# Patient Record
(demographics unavailable — no encounter records)

---

## 2022-04-04 NOTE — PHYS DOC
Past Medical History


Past Medical History:  Anxiety, Other


Additional Past Medical Histor:  Cushings


Past Surgical History:  Other


Additional Past Surgical Histo:  Uterine fibroid removal, D&C


Smoking Status:  Never Smoker


Alcohol Use:  None


Drug Use:  None





General Adult


EDM:


Chief Complaint:  COUGH





HPI:


HPI:





Patient is a 24-year-old female that presents today with cough, fever, ear pain,

and body aches.  Patient states her symptoms started approximately last Friday 

and have progressively gotten worse over the last couple of days, the only 

medication she is taken for her symptoms is ibuprofen.  Patient states that she 

had Covid back in January 2022 and she said she made a full recovery and was not

having any problems post Covid until last Friday.





Review of Systems:


Review of Systems:


Constitutional:   fever or chills. []


Eyes:   Denies change in visual acuity. []


HENT:   nasal congestion and ear pain or denies sore throat. [] 


Respiratory: Cough denies shortness of breath. [] 


Cardiovascular:   Denies chest pain or edema. [] 


GI:   Denies abdominal pain, nausea, vomiting, bloody stools or diarrhea. [] 


:  Denies dysuria. [] 


Musculoskeletal:   Denies back pain or joint pain. [] 


Integument:   Denies rash. [] 


Neurologic:   Denies headache, focal weakness or sensory changes. [] 


Endocrine:   Denies polyuria or polydipsia. [] 


Lymphatic:  Denies swollen glands. [] 


Psychiatric:  Denies depression or anxiety. []





Heart Score:


C/O Chest Pain:  No


Risk Factors:


Risk Factors:  DM, Current or recent (<one month) smoker, HTN, HLP, family 

history of CAD, obesity.


Risk Scores:


Score 0 - 3:  2.5% MACE over next 6 weeks - Discharge Home


Score 4 - 6:  20.3% MACE over next 6 weeks - Admit for Clinical Observation


Score 7 - 10:  72.7% MACE over next 6 weeks - Early Invasive Strategies





Current Medications:





Current Medications








 Medications


  (Trade)  Dose


 Ordered  Sig/Georgie  Start Time


 Stop Time Status Last Admin


Dose Admin


 


 Sodium Chloride  1,000 ml @ 


 999 mls/hr  1X  ONCE  4/4/22 23:15


 4/5/22 00:15 UNV  














Allergies:


Allergies:





Allergies








Coded Allergies Type Severity Reaction Last Updated Verified


 


  ibuprofen Allergy Intermediate  3/9/22 Yes


 


  sulfamethoxazole Allergy Intermediate  3/9/22 Yes


 


  trimethoprim Allergy Intermediate  3/9/22 Yes


 


  cephalexin Allergy Unknown  4/4/22 Yes


 


  nitrofurantoin Allergy Unknown  4/4/22 Yes











Physical Exam:


PE:





Constitutional: Well developed, well nourished, no acute distress, non-toxic 

appearance. []


HENT: Normocephalic, atraumatic, unable to visualize tympanic membranes due to 

earwax, oropharynx reddened and moist, no oral exudates, nose swollen and 

reddened. []


Eyes: PERRLA, EOMI, conjunctiva normal, no discharge. [] 


Neck: Normal range of motion, no tenderness, supple, no stridor. [] 


Cardiovascular:Heart rate regular rhythm, no murmur []


Lungs & Thorax:  Bilateral breath sounds wheezes with diminished bases


Abdomen: Bowel sounds normal, soft, no tenderness, no masses, no pulsatile 

masses. [] 


Skin: Warm, dry, no erythema, no rash. [] 


Back: No tenderness, no CVA tenderness. [] 


Extremities: No tenderness, no cyanosis, no clubbing, ROM intact, no edema. [] 


Neurologic: Alert and oriented X 3, normal motor function, normal sensory 

function, no focal deficits noted. []


Psychologic: Affect normal, judgement normal, mood normal. []





Current Patient Data:


Labs:





Laboratory Tests








Test


 4/5/22


00:05


 


White Blood Count 6.1 x10^3/uL 


 


Red Blood Count 4.09 x10^6/uL 


 


Hemoglobin 12.8 g/dL 


 


Hematocrit 38.1 % 


 


Mean Corpuscular Volume 93 fL 


 


Mean Corpuscular Hemoglobin 31 pg 


 


Mean Corpuscular Hemoglobin


Concent 34 g/dL 





 


Red Cell Distribution Width 14.4 % 


 


Platelet Count 234 x10^3/uL 


 


Neutrophils (%) (Auto) 61 % 


 


Lymphocytes (%) (Auto) 25 % 


 


Monocytes (%) (Auto) 10 % 


 


Eosinophils (%) (Auto) 4 % 


 


Basophils (%) (Auto) 1 % 


 


Neutrophils # (Auto) 3.7 x10^3/uL 


 


Lymphocytes # (Auto) 1.5 x10^3/uL 


 


Monocytes # (Auto) 0.6 x10^3/uL 


 


Eosinophils # (Auto) 0.2 x10^3/uL 


 


Basophils # (Auto) 0.0 x10^3/uL 


 


D-Dimer (Janice) 0.60 ug/mlFEU 


 


Sodium Level 138 mmol/L 


 


Potassium Level 4.5 mmol/L 


 


Chloride Level 103 mmol/L 


 


Carbon Dioxide Level 32 mmol/L 


 


Anion Gap 3 


 


Blood Urea Nitrogen 20 mg/dL 


 


Creatinine 1.0 mg/dL 


 


Estimated GFR


(Cockcroft-Gault) 68.1 





 


BUN/Creatinine Ratio 20 


 


Glucose Level 102 mg/dL 


 


Lactic Acid Level 0.8 mmol/L 


 


Calcium Level 9.3 mg/dL 


 


Total Bilirubin 0.4 mg/dL 


 


Aspartate Amino Transf


(AST/SGOT) 27 U/L 





 


Alanine Aminotransferase


(ALT/SGPT) 47 U/L 





 


Alkaline Phosphatase 72 U/L 


 


Total Protein 8.0 g/dL 


 


Albumin 3.7 g/dL 


 


Albumin/Globulin Ratio 0.9 


 


Influenza Type A Antigen Negative 


 


Influenza Type B Antigen Negative 


 


SARS-CoV-2 Antigen (Rapid) Negative 








Current Medications








 Medications


  (Trade)  Dose


 Ordered  Sig/Georgie


 Route


 PRN Reason  Start Time


 Stop Time Status Last Admin


Dose Admin


 


 Sodium Chloride  1,000 ml @ 


 999 mls/hr  1X  ONCE


 IV


   4/4/22 23:15


 4/5/22 00:15 DC 4/5/22 00:07





 


 Iohexol


  (Omnipaque 350


 Mg/ml)  100 ml  1X  ONCE


 IV


   4/5/22 01:00


 4/5/22 01:01   





 


 Info


  (CONTRAST GIVEN


 -- Rx MONITORING)  1 each  PRN DAILY  PRN


 [facility]


 SEE COMMENTS  4/5/22 01:00


 4/7/22 00:59   











Vital Signs:





                                   Vital Signs








  Date Time  Temp Pulse Resp B/P (MAP) Pulse Ox O2 Delivery O2 Flow Rate FiO2


 


4/4/22 22:40 98.3 86 20 159/84 (109) 95 Room Air  





 98.3       











EKG:


EKG:


[]





Radiology/Procedures:


Radiology/Procedures:


[REASON: COUGH AND FEVER


PROCEDURE: CHEST AP ONLY





Exam: Chest one view





INDICATION: Cough and fever





TECHNIQUE: Frontal view of the chest





Comparisons: 2/12/2021





FINDINGS:


The cardiomediastinal silhouette and pulmonary vessels are within normal limits.





The lung and pleural spaces are clear.





IMPRESSION:


No acute cardiopulmonary process.





Electronically signed by: Deana Price MD (4/4/2022 11:53 PM) [facility]





]





Course & Med Decision Making:


Course & Med Decision Making


Pertinent Labs and Imaging studies reviewed. (See chart for details)





0100 reviewed laboratory results with patient did inform her that her D-dimer 

was elevated and we will perform a CT scan of her chest to rule out a blood 

clot.  Patient is agreeable with the plan of care.  Signout patient to Dr. Mejia at this time.





Dragon Disclaimer:


Dragon Disclaimer:


This electronic medical record was generated, in whole or in part, using a voice

recognition dictation system.





Departure


Departure


Referrals:  


UNKNOWN PCP NAME (PCP)











MAUREEN ANDERSON        Apr 4, 2022 23:23

## 2022-04-04 NOTE — RAD
Exam: Chest one view



INDICATION: Cough and fever



TECHNIQUE: Frontal view of the chest



Comparisons: 2/12/2021



FINDINGS:

The cardiomediastinal silhouette and pulmonary vessels are within normal limits.



The lung and pleural spaces are clear.



IMPRESSION:

No acute cardiopulmonary process.



Electronically signed by: Deana Price MD (4/4/2022 11:53 PM) [facility]

## 2022-04-05 NOTE — RAD
Study: CT CHEST WITH CONTRAST - PULMONARY ANGIOGRAM



History: Shortness of air, cough



Comparison: None



Technique:  Helical CT of the chest performed after the administration of 100 mL Omnipaque 350 intrav
enous contrast and timed for angiographic evaluation of the pulmonary arteries per PE protocol. Coron
al and sagittal 3D MIP reformations were obtained.



One or more of the following individualized dose reduction techniques were utilized for this examinat
ion:  



1. Automated exposure control

2. Adjustment of the mA and/or kV according to patient size

3. Use of iterative reconstruction technique.



Findings: 



Pulmonary Arteries: There is a suboptimal contrast bolus and motion artifact.. No acute pulmonary emb
olism in the central pulmonary arteries however cannot evaluate the segmental and subsegmental pulmon
sugey arteries.



Heart/Systemic Vasculature: The heart is normal in size. The thoracic aorta is normal in caliber.



Mediastinum: No lymphadenopathy.



Lungs: There is motion artifact. The lungs are clear. No pleural effusion or pneumothorax.



Neck/Axilla/Body Wall: No axillary lymphadenopathy. Visualized portion of thyroid gland is unremarkab
le.



Upper Abdomen: Visualized portion of the upper abdomen is unremarkable.



Bones: No acute osseous abnormality.



Miscellaneous: None



IMPRESSION: 

1. Suboptimal contrast bolus and motion artifact limits the exam. There is no acute pulmonary embolis
m in the central pulmonary arteries. Unable to evaluate the segmental and subsegmental pulmonary efra
[place] due to limitations of the exam.



2. No evidence pneumonia.



Electronically signed by: Kailey Wild MD (4/5/2022 2:15 AM) [facility]